# Patient Record
Sex: MALE
[De-identification: names, ages, dates, MRNs, and addresses within clinical notes are randomized per-mention and may not be internally consistent; named-entity substitution may affect disease eponyms.]

---

## 2019-08-15 ENCOUNTER — HOSPITAL ENCOUNTER (OUTPATIENT)
Dept: HOSPITAL 95 - ORSCSDS | Age: 64
Discharge: HOME | End: 2019-08-15
Attending: SURGERY
Payer: MEDICARE

## 2019-08-15 VITALS — HEIGHT: 65 IN | WEIGHT: 215.39 LBS | BODY MASS INDEX: 35.89 KG/M2

## 2019-08-15 DIAGNOSIS — R09.02: ICD-10-CM

## 2019-08-15 DIAGNOSIS — E78.5: ICD-10-CM

## 2019-08-15 DIAGNOSIS — I10: ICD-10-CM

## 2019-08-15 DIAGNOSIS — Z79.899: ICD-10-CM

## 2019-08-15 DIAGNOSIS — R19.5: Primary | ICD-10-CM

## 2019-08-15 DIAGNOSIS — J44.9: ICD-10-CM

## 2019-08-15 DIAGNOSIS — D12.2: ICD-10-CM

## 2019-08-15 DIAGNOSIS — Z86.010: ICD-10-CM

## 2019-08-15 DIAGNOSIS — F17.210: ICD-10-CM

## 2019-08-15 DIAGNOSIS — Z80.0: ICD-10-CM

## 2019-08-15 DIAGNOSIS — E66.01: ICD-10-CM

## 2019-08-15 PROCEDURE — 0DBK8ZX EXCISION OF ASCENDING COLON, VIA NATURAL OR ARTIFICIAL OPENING ENDOSCOPIC, DIAGNOSTIC: ICD-10-PCS | Performed by: SURGERY

## 2019-08-15 NOTE — NUR
08/15/19 0828 Justice Orozco
PAIN STIMULATOR LOCATED IN LOWER LEFT BACK. MD AWARE. MD OK TO PROCEED
WITHOUT TURNING IT OFF.

## 2019-10-11 ENCOUNTER — HOSPITAL ENCOUNTER (EMERGENCY)
Dept: HOSPITAL 95 - ER | Age: 64
Discharge: HOME | End: 2019-10-11
Payer: MEDICARE

## 2019-10-11 VITALS — WEIGHT: 205.01 LBS | BODY MASS INDEX: 34.16 KG/M2 | HEIGHT: 65 IN

## 2019-10-11 DIAGNOSIS — Z79.891: ICD-10-CM

## 2019-10-11 DIAGNOSIS — I10: ICD-10-CM

## 2019-10-11 DIAGNOSIS — F17.200: ICD-10-CM

## 2019-10-11 DIAGNOSIS — Z88.5: ICD-10-CM

## 2019-10-11 DIAGNOSIS — R07.0: Primary | ICD-10-CM

## 2019-10-11 DIAGNOSIS — Z79.899: ICD-10-CM

## 2021-10-04 ENCOUNTER — HOSPITAL ENCOUNTER (OUTPATIENT)
Dept: HOSPITAL 95 - ORSCSDS | Age: 66
Discharge: HOME | End: 2021-10-04
Attending: ORTHOPAEDIC SURGERY
Payer: MEDICARE

## 2021-10-04 VITALS — HEIGHT: 65 IN | BODY MASS INDEX: 33.98 KG/M2 | WEIGHT: 203.93 LBS

## 2021-10-04 DIAGNOSIS — Z79.899: ICD-10-CM

## 2021-10-04 DIAGNOSIS — F17.210: ICD-10-CM

## 2021-10-04 DIAGNOSIS — G56.21: Primary | ICD-10-CM

## 2021-10-04 DIAGNOSIS — E66.9: ICD-10-CM

## 2021-10-04 DIAGNOSIS — I10: ICD-10-CM

## 2021-10-04 DIAGNOSIS — Z79.84: ICD-10-CM

## 2021-10-04 PROCEDURE — 01N40ZZ RELEASE ULNAR NERVE, OPEN APPROACH: ICD-10-PCS | Performed by: ORTHOPAEDIC SURGERY

## 2021-10-04 NOTE — NUR
10/04/21 1129 [facility]
PT HAS BILATERAL CHIN BANDAGES. PER DR DEVI BANDAGES REMOVED, LEGS DRY &
REDDENED. OK TO PROCEED W/ SURGERY PER DR PARRA. DR PARRA PUT NEW
DRESSING ON BOTH LEGS W/ XEROFORM, ANIYAH, & COBAN. PT TOW.

## 2022-04-11 ENCOUNTER — HOSPITAL ENCOUNTER (OUTPATIENT)
Dept: HOSPITAL 95 - ORSCSDS | Age: 67
Discharge: HOME | End: 2022-04-11
Attending: ORTHOPAEDIC SURGERY
Payer: MEDICARE

## 2022-04-11 VITALS — HEIGHT: 65 IN | BODY MASS INDEX: 37.69 KG/M2 | WEIGHT: 226.19 LBS

## 2022-04-11 DIAGNOSIS — J44.9: ICD-10-CM

## 2022-04-11 DIAGNOSIS — F32.A: ICD-10-CM

## 2022-04-11 DIAGNOSIS — Z79.899: ICD-10-CM

## 2022-04-11 DIAGNOSIS — F17.210: ICD-10-CM

## 2022-04-11 DIAGNOSIS — K21.9: ICD-10-CM

## 2022-04-11 DIAGNOSIS — D21.11: Primary | ICD-10-CM

## 2022-04-11 DIAGNOSIS — I10: ICD-10-CM

## 2022-04-11 PROCEDURE — 0JBD0ZZ EXCISION OF RIGHT UPPER ARM SUBCUTANEOUS TISSUE AND FASCIA, OPEN APPROACH: ICD-10-PCS | Performed by: ORTHOPAEDIC SURGERY

## 2022-04-11 NOTE — NUR
04/11/22 1313 Mehreen Viramontes A
QUARTER SIZE RED AREA WITH PINPOINT WHITE HEAD NOTED ON RIGHT LATERAL
CHEST.  SURGEON AWARE.  NO DRAINAGE OR OPEN AREA NOTED.

## 2022-04-11 NOTE — NUR
04/11/22 1444 LUL GELLER
INFORMED BY DR ALEXIS IN PACU THAT PT BECOMES SOB WITH NORMAL
CONVERSATION PRIOR TO SURGERY. SOB IS HIS BASELINE. DENIES THAT HE HAS
COPD- HAS ALBUTEROL INHALER. PT NOT DIABETIC. LOWER EXTREMITIES RED
BELOW THE KNEE AND HAS SUPERFICIAL OPEN WOUNDS BOTH ANTERIOR AND
POSTERIOR. PREOP O2 DOCUMENTED AT 96%

## 2022-06-06 ENCOUNTER — HOSPITAL ENCOUNTER (OUTPATIENT)
Dept: HOSPITAL 95 - WOUND | Age: 67
Discharge: HOME | End: 2022-06-06
Attending: SURGERY
Payer: MEDICARE

## 2022-06-06 DIAGNOSIS — M54.16: ICD-10-CM

## 2022-06-06 DIAGNOSIS — L97.829: ICD-10-CM

## 2022-06-06 DIAGNOSIS — M20.42: ICD-10-CM

## 2022-06-06 DIAGNOSIS — I25.10: ICD-10-CM

## 2022-06-06 DIAGNOSIS — J44.9: ICD-10-CM

## 2022-06-06 DIAGNOSIS — L97.819: ICD-10-CM

## 2022-06-06 DIAGNOSIS — I70.213: ICD-10-CM

## 2022-06-06 DIAGNOSIS — I87.311: Primary | ICD-10-CM

## 2022-06-06 DIAGNOSIS — L84: ICD-10-CM

## 2022-06-06 DIAGNOSIS — I10: ICD-10-CM

## 2022-06-06 DIAGNOSIS — M79.672: ICD-10-CM

## 2022-06-06 DIAGNOSIS — F17.210: ICD-10-CM

## 2022-06-06 DIAGNOSIS — M79.671: ICD-10-CM

## 2022-06-06 DIAGNOSIS — I87.2: ICD-10-CM

## 2022-06-06 DIAGNOSIS — Z88.5: ICD-10-CM

## 2022-06-06 DIAGNOSIS — B35.1: ICD-10-CM

## 2022-06-06 DIAGNOSIS — I87.312: ICD-10-CM

## 2022-06-06 PROCEDURE — G0463 HOSPITAL OUTPT CLINIC VISIT: HCPCS

## 2022-06-13 ENCOUNTER — HOSPITAL ENCOUNTER (OUTPATIENT)
Dept: HOSPITAL 95 - WOUND | Age: 67
Discharge: HOME | End: 2022-06-13
Attending: SURGERY
Payer: MEDICARE

## 2022-06-13 DIAGNOSIS — M54.16: ICD-10-CM

## 2022-06-13 DIAGNOSIS — L89.029: ICD-10-CM

## 2022-06-13 DIAGNOSIS — L84: ICD-10-CM

## 2022-06-13 DIAGNOSIS — I87.313: Primary | ICD-10-CM

## 2022-06-13 DIAGNOSIS — L97.819: ICD-10-CM

## 2022-06-13 DIAGNOSIS — M20.42: ICD-10-CM

## 2022-06-13 DIAGNOSIS — Z72.0: ICD-10-CM

## 2022-06-13 DIAGNOSIS — B35.1: ICD-10-CM

## 2022-06-13 DIAGNOSIS — L97.822: ICD-10-CM

## 2022-06-13 DIAGNOSIS — I70.213: ICD-10-CM

## 2022-06-13 PROCEDURE — G0463 HOSPITAL OUTPT CLINIC VISIT: HCPCS

## 2022-06-13 PROCEDURE — A9270 NON-COVERED ITEM OR SERVICE: HCPCS

## 2022-06-20 ENCOUNTER — HOSPITAL ENCOUNTER (OUTPATIENT)
Dept: HOSPITAL 95 - WOUND | Age: 67
Discharge: HOME | End: 2022-06-20
Attending: SURGERY
Payer: MEDICARE

## 2022-06-20 DIAGNOSIS — B35.1: ICD-10-CM

## 2022-06-20 DIAGNOSIS — M79.671: ICD-10-CM

## 2022-06-20 DIAGNOSIS — M54.16: ICD-10-CM

## 2022-06-20 DIAGNOSIS — M79.672: ICD-10-CM

## 2022-06-20 DIAGNOSIS — L84: ICD-10-CM

## 2022-06-20 DIAGNOSIS — I70.213: ICD-10-CM

## 2022-06-20 DIAGNOSIS — L89.029: Primary | ICD-10-CM

## 2022-06-20 DIAGNOSIS — I87.303: ICD-10-CM

## 2022-06-20 DIAGNOSIS — M20.42: ICD-10-CM

## 2022-06-20 PROCEDURE — G0463 HOSPITAL OUTPT CLINIC VISIT: HCPCS

## 2022-06-20 PROCEDURE — A9270 NON-COVERED ITEM OR SERVICE: HCPCS

## 2022-06-27 ENCOUNTER — HOSPITAL ENCOUNTER (OUTPATIENT)
Dept: HOSPITAL 95 - WOUND | Age: 67
Discharge: HOME | End: 2022-06-27
Attending: SURGERY
Payer: MEDICARE

## 2022-06-27 DIAGNOSIS — M54.16: ICD-10-CM

## 2022-06-27 DIAGNOSIS — Z72.0: ICD-10-CM

## 2022-06-27 DIAGNOSIS — I87.313: ICD-10-CM

## 2022-06-27 DIAGNOSIS — L84: ICD-10-CM

## 2022-06-27 DIAGNOSIS — M20.42: ICD-10-CM

## 2022-06-27 DIAGNOSIS — L89.029: Primary | ICD-10-CM

## 2022-06-27 DIAGNOSIS — M20.41: ICD-10-CM

## 2022-06-27 DIAGNOSIS — I70.213: ICD-10-CM

## 2022-06-27 DIAGNOSIS — I73.9: ICD-10-CM

## 2022-06-27 DIAGNOSIS — B35.1: ICD-10-CM

## 2022-06-27 PROCEDURE — G0463 HOSPITAL OUTPT CLINIC VISIT: HCPCS

## 2022-08-08 ENCOUNTER — HOSPITAL ENCOUNTER (OUTPATIENT)
Dept: HOSPITAL 95 - LAB | Age: 67
End: 2022-08-08
Attending: FAMILY MEDICINE
Payer: MEDICARE

## 2022-08-08 DIAGNOSIS — E78.5: ICD-10-CM

## 2022-08-08 DIAGNOSIS — I10: Primary | ICD-10-CM

## 2022-08-08 LAB
ALBUMIN SERPL BCP-MCNC: 3.6 G/DL (ref 3.4–5)
ALBUMIN/GLOB SERPL: 1.3 {RATIO} (ref 0.8–1.8)
ALT SERPL W P-5'-P-CCNC: 53 U/L (ref 12–78)
ANION GAP SERPL CALCULATED.4IONS-SCNC: 6 MMOL/L (ref 6–16)
AST SERPL W P-5'-P-CCNC: 35 U/L (ref 12–37)
BILIRUB SERPL-MCNC: 0.3 MG/DL (ref 0.1–1)
BUN SERPL-MCNC: 28 MG/DL (ref 8–24)
CALCIUM SERPL-MCNC: 9 MG/DL (ref 8.5–10.1)
CHLORIDE SERPL-SCNC: 110 MMOL/L (ref 98–108)
CHOLEST SERPL-MCNC: 126 MG/DL (ref 50–200)
CHOLEST/HDLC SERPL: 1.7 {RATIO}
CO2 SERPL-SCNC: 26 MMOL/L (ref 21–32)
CREAT SERPL-MCNC: 1.07 MG/DL (ref 0.6–1.2)
GLOBULIN SER CALC-MCNC: 2.7 G/DL (ref 2.2–4)
GLUCOSE SERPL-MCNC: 100 MG/DL (ref 70–99)
HDLC SERPL-MCNC: 76 MG/DL (ref 39–?)
LDLC SERPL CALC-MCNC: 38 MG/DL (ref 0–110)
LDLC/HDLC SERPL: 0.5 {RATIO}
POTASSIUM SERPL-SCNC: 4.5 MMOL/L (ref 3.5–5.5)
PROT SERPL-MCNC: 6.3 G/DL (ref 6.4–8.2)
SODIUM SERPL-SCNC: 142 MMOL/L (ref 136–145)
TRIGL SERPL-MCNC: 58 MG/DL (ref 30–160)
VLDLC SERPL CALC-MCNC: 11 MG/DL (ref 6–32)

## 2022-12-13 ENCOUNTER — HOSPITAL ENCOUNTER (INPATIENT)
Dept: HOSPITAL 95 - ER | Age: 67
LOS: 4 days | DRG: 91 | End: 2022-12-17
Attending: HOSPITALIST | Admitting: HOSPITALIST
Payer: MEDICARE

## 2022-12-13 VITALS — BODY MASS INDEX: 31.51 KG/M2 | WEIGHT: 207.9 LBS | HEIGHT: 68 IN

## 2022-12-13 DIAGNOSIS — E86.0: ICD-10-CM

## 2022-12-13 DIAGNOSIS — Z51.5: ICD-10-CM

## 2022-12-13 DIAGNOSIS — N18.2: ICD-10-CM

## 2022-12-13 DIAGNOSIS — K46.9: ICD-10-CM

## 2022-12-13 DIAGNOSIS — F17.210: ICD-10-CM

## 2022-12-13 DIAGNOSIS — Z98.41: ICD-10-CM

## 2022-12-13 DIAGNOSIS — E87.6: ICD-10-CM

## 2022-12-13 DIAGNOSIS — B95.61: ICD-10-CM

## 2022-12-13 DIAGNOSIS — R79.1: ICD-10-CM

## 2022-12-13 DIAGNOSIS — L89.890: ICD-10-CM

## 2022-12-13 DIAGNOSIS — J18.9: ICD-10-CM

## 2022-12-13 DIAGNOSIS — E78.5: ICD-10-CM

## 2022-12-13 DIAGNOSIS — E87.21: ICD-10-CM

## 2022-12-13 DIAGNOSIS — Z96.652: ICD-10-CM

## 2022-12-13 DIAGNOSIS — I95.9: ICD-10-CM

## 2022-12-13 DIAGNOSIS — I46.8: ICD-10-CM

## 2022-12-13 DIAGNOSIS — Z98.42: ICD-10-CM

## 2022-12-13 DIAGNOSIS — G89.29: ICD-10-CM

## 2022-12-13 DIAGNOSIS — Z79.51: ICD-10-CM

## 2022-12-13 DIAGNOSIS — E87.0: ICD-10-CM

## 2022-12-13 DIAGNOSIS — L89.320: ICD-10-CM

## 2022-12-13 DIAGNOSIS — I12.9: ICD-10-CM

## 2022-12-13 DIAGNOSIS — Z98.890: ICD-10-CM

## 2022-12-13 DIAGNOSIS — Z20.822: ICD-10-CM

## 2022-12-13 DIAGNOSIS — E83.51: ICD-10-CM

## 2022-12-13 DIAGNOSIS — M54.9: ICD-10-CM

## 2022-12-13 DIAGNOSIS — L89.150: ICD-10-CM

## 2022-12-13 DIAGNOSIS — R78.81: ICD-10-CM

## 2022-12-13 DIAGNOSIS — Z79.899: ICD-10-CM

## 2022-12-13 DIAGNOSIS — G92.8: Primary | ICD-10-CM

## 2022-12-13 DIAGNOSIS — F41.9: ICD-10-CM

## 2022-12-13 DIAGNOSIS — G93.1: ICD-10-CM

## 2022-12-13 DIAGNOSIS — N17.0: ICD-10-CM

## 2022-12-13 DIAGNOSIS — Z79.52: ICD-10-CM

## 2022-12-13 DIAGNOSIS — F10.20: ICD-10-CM

## 2022-12-13 DIAGNOSIS — K21.9: ICD-10-CM

## 2022-12-13 DIAGNOSIS — Z88.5: ICD-10-CM

## 2022-12-13 DIAGNOSIS — Z66: ICD-10-CM

## 2022-12-13 DIAGNOSIS — J96.01: ICD-10-CM

## 2022-12-13 LAB
ALBUMIN SERPL BCP-MCNC: (no result) G/DL (ref 3.4–5)
ALT SERPL W P-5'-P-CCNC: 69 U/L (ref 12–78)
ANION GAP SERPL CALCULATED.4IONS-SCNC: 17 MMOL/L (ref 6–16)
ANION GAP SERPL CALCULATED.4IONS-SCNC: 21 MMOL/L (ref 6–16)
AST SERPL W P-5'-P-CCNC: 80 U/L (ref 12–37)
BASOPHILS # BLD: 0 K/MM3 (ref 0–0.23)
BASOPHILS NFR BLD: 0 % (ref 0–2)
BILIRUB SERPL-MCNC: 0.4 MG/DL (ref 0.1–1)
BUN SERPL-MCNC: 102 MG/DL (ref 8–24)
BUN SERPL-MCNC: 97 MG/DL (ref 8–24)
CALCIUM SERPL-MCNC: 7.5 MG/DL (ref 8.5–10.1)
CALCIUM SERPL-MCNC: 8.3 MG/DL (ref 8.5–10.1)
CHLORIDE SERPL-SCNC: 107 MMOL/L (ref 98–108)
CHLORIDE SERPL-SCNC: 112 MMOL/L (ref 98–108)
CK SERPL-CCNC: 260 U/L (ref 39–308)
CO2 SERPL-SCNC: 12 MMOL/L (ref 21–32)
CO2 SERPL-SCNC: 12 MMOL/L (ref 21–32)
CREAT SERPL-MCNC: 4.12 MG/DL (ref 0.6–1.2)
CREAT SERPL-MCNC: 4.84 MG/DL (ref 0.6–1.2)
DEPRECATED RDW RBC AUTO: 60 FL (ref 35.1–46.3)
EOSINOPHIL # BLD: 0 K/MM3 (ref 0–0.68)
EOSINOPHIL NFR BLD: 0 % (ref 0–6)
ERYTHROCYTE [DISTWIDTH] IN BLOOD BY AUTOMATED COUNT: 18.5 % (ref 11.7–14.2)
ETHANOL SERPL-MCNC: <3 MG/DL
FLUAV RNA SPEC QL NAA+PROBE: NEGATIVE
FLUBV RNA SPEC QL NAA+PROBE: NEGATIVE
GLUCOSE SERPL-MCNC: 74 MG/DL (ref 70–99)
GLUCOSE SERPL-MCNC: 91 MG/DL (ref 70–99)
HCT VFR BLD AUTO: 33.9 % (ref 37–53)
HGB BLD-MCNC: 11.5 G/DL (ref 13.5–17.5)
LYMPHOCYTES # BLD: 2.62 K/MM3 (ref 0.84–5.2)
LYMPHOCYTES NFR BLD: 7 % (ref 21–46)
MAGNESIUM SERPL-MCNC: 2.6 MG/DL (ref 1.6–2.4)
MCHC RBC AUTO-ENTMCNC: 33.9 G/DL (ref 31.5–36.5)
MCV RBC AUTO: 89 FL (ref 80–100)
MONOCYTES # BLD: 1.87 K/MM3 (ref 0.16–1.47)
MONOCYTES NFR BLD: 5 % (ref 4–13)
NEUTS BAND NFR BLD MANUAL: 10 % (ref 0–8)
NEUTS SEG # BLD MANUAL: 33 K/MM3 (ref 1.96–9.15)
NEUTS SEG NFR BLD MANUAL: 78 % (ref 41–73)
NRBC # BLD AUTO: 0.08 K/MM3 (ref 0–0.02)
NRBC BLD AUTO-RTO: 0.2 /100 WBC (ref 0–0.2)
PH BLDV: 7.16 [PH] (ref 7.34–7.37)
PH BLDV: 7.19 [PH] (ref 7.34–7.37)
PLATELET # BLD AUTO: 310 K/MM3 (ref 150–400)
POTASSIUM SERPL-SCNC: 3.3 MMOL/L (ref 3.5–5.5)
POTASSIUM SERPL-SCNC: 4.9 MMOL/L (ref 3.5–5.5)
PROT SERPL-MCNC: 7.2 G/DL (ref 6.4–8.2)
PROT UR STRIP-MCNC: (no result) MG/DL
PROTHROMBIN TIME: 14 SEC (ref 9.7–11.5)
RBC #/AREA URNS HPF: (no result) /HPF (ref 0–2)
RSV RNA SPEC QL NAA+PROBE: NEGATIVE
SARS-COV-2 RNA RESP QL NAA+PROBE: NEGATIVE
SODIUM SERPL-SCNC: 140 MMOL/L (ref 136–145)
SODIUM SERPL-SCNC: 141 MMOL/L (ref 136–145)
SP GR SPEC: 1.01 (ref 1–1.02)
TOTAL CELLS COUNTED BLD: 100
TSH SERPL DL<=0.005 MIU/L-ACNC: 1.2 UIU/ML (ref 0.36–4.8)
UROBILINOGEN UR STRIP-MCNC: (no result) MG/DL
WBC #/AREA URNS HPF: (no result) /HPF (ref 0–5)

## 2022-12-13 PROCEDURE — A9270 NON-COVERED ITEM OR SERVICE: HCPCS

## 2022-12-13 PROCEDURE — C1751 CATH, INF, PER/CENT/MIDLINE: HCPCS

## 2022-12-13 PROCEDURE — 5A1945Z RESPIRATORY VENTILATION, 24-96 CONSECUTIVE HOURS: ICD-10-PCS | Performed by: HOSPITALIST

## 2022-12-13 PROCEDURE — P9047 ALBUMIN (HUMAN), 25%, 50ML: HCPCS

## 2022-12-13 PROCEDURE — G0480 DRUG TEST DEF 1-7 CLASSES: HCPCS

## 2022-12-13 PROCEDURE — C9113 INJ PANTOPRAZOLE SODIUM, VIA: HCPCS

## 2022-12-14 LAB
ALBUMIN SERPL BCP-MCNC: 1.2 G/DL (ref 3.4–5)
ALBUMIN SERPL BCP-MCNC: 1.7 G/DL (ref 3.4–5)
ALBUMIN SERPL BCP-MCNC: 2.1 G/DL (ref 3.4–5)
ALBUMIN/GLOB SERPL: 0.3 {RATIO} (ref 0.8–1.8)
ALT SERPL W P-5'-P-CCNC: 61 U/L (ref 12–78)
ANION GAP SERPL CALCULATED.4IONS-SCNC: 17 MMOL/L (ref 6–16)
ANION GAP SERPL CALCULATED.4IONS-SCNC: 20 MMOL/L (ref 6–16)
ANION GAP SERPL CALCULATED.4IONS-SCNC: 23 MMOL/L (ref 6–16)
AST SERPL W P-5'-P-CCNC: 100 U/L (ref 12–37)
BASOPHILS # BLD AUTO: 0.15 K/MM3 (ref 0–0.23)
BASOPHILS NFR BLD AUTO: 1 % (ref 0–2)
BILIRUB SERPL-MCNC: 0.4 MG/DL (ref 0.1–1)
BUN SERPL-MCNC: 103 MG/DL (ref 8–24)
BUN SERPL-MCNC: 95 MG/DL (ref 8–24)
BUN SERPL-MCNC: 98 MG/DL (ref 8–24)
CALCIUM SERPL-MCNC: 6.3 MG/DL (ref 8.5–10.1)
CALCIUM SERPL-MCNC: 6.6 MG/DL (ref 8.5–10.1)
CALCIUM SERPL-MCNC: 7.7 MG/DL (ref 8.5–10.1)
CHLORIDE SERPL-SCNC: 107 MMOL/L (ref 98–108)
CHLORIDE SERPL-SCNC: 110 MMOL/L (ref 98–108)
CHLORIDE SERPL-SCNC: 111 MMOL/L (ref 98–108)
CO2 SERPL-SCNC: 14 MMOL/L (ref 21–32)
CO2 SERPL-SCNC: 19 MMOL/L (ref 21–32)
CO2 SERPL-SCNC: 21 MMOL/L (ref 21–32)
CREAT SERPL-MCNC: 3.7 MG/DL (ref 0.6–1.2)
CREAT SERPL-MCNC: 3.77 MG/DL (ref 0.6–1.2)
CREAT SERPL-MCNC: 4.32 MG/DL (ref 0.6–1.2)
DEPRECATED RDW RBC AUTO: 64.8 FL (ref 35.1–46.3)
EOSINOPHIL # BLD AUTO: 0.01 K/MM3 (ref 0–0.68)
EOSINOPHIL NFR BLD AUTO: 0 % (ref 0–6)
ERYTHROCYTE [DISTWIDTH] IN BLOOD BY AUTOMATED COUNT: 19.2 % (ref 11.7–14.2)
GLOBULIN SER CALC-MCNC: 4.4 G/DL (ref 2.2–4)
GLUCOSE SERPL-MCNC: 170 MG/DL (ref 70–99)
GLUCOSE SERPL-MCNC: 69 MG/DL (ref 70–99)
GLUCOSE SERPL-MCNC: 94 MG/DL (ref 70–99)
HCT VFR BLD AUTO: 29.8 % (ref 37–53)
HGB BLD-MCNC: 9.8 G/DL (ref 13.5–17.5)
IMM GRANULOCYTES # BLD AUTO: 1.42 K/MM3 (ref 0–0.1)
IMM GRANULOCYTES NFR BLD AUTO: 5 % (ref 0–1)
LEUKOCYTE ESTERASE UR QL STRIP: (no result)
LYMPHOCYTES # BLD AUTO: 3.24 K/MM3 (ref 0.84–5.2)
LYMPHOCYTES NFR BLD AUTO: 10 % (ref 21–46)
MAGNESIUM SERPL-MCNC: 3.5 MG/DL (ref 1.6–2.4)
MCHC RBC AUTO-ENTMCNC: 32.9 G/DL (ref 31.5–36.5)
MCV RBC AUTO: 92 FL (ref 80–100)
MONOCYTES # BLD AUTO: 1.62 K/MM3 (ref 0.16–1.47)
MONOCYTES NFR BLD AUTO: 5 % (ref 4–13)
NEUTROPHILS # BLD AUTO: 25.06 K/MM3 (ref 1.96–9.15)
NEUTROPHILS NFR BLD AUTO: 80 % (ref 41–73)
NRBC # BLD AUTO: 0.41 K/MM3 (ref 0–0.02)
NRBC BLD AUTO-RTO: 1.3 /100 WBC (ref 0–0.2)
PH BLDA: 7.08 [PH] (ref 7.35–7.45)
PHOSPHATE SERPL-MCNC: 4.2 MG/DL (ref 2.5–4.9)
PHOSPHATE SERPL-MCNC: 6.5 MG/DL (ref 2.5–4.9)
PLATELET # BLD AUTO: 314 K/MM3 (ref 150–400)
POTASSIUM SERPL-SCNC: 2.9 MMOL/L (ref 3.5–5.5)
POTASSIUM SERPL-SCNC: 3.2 MMOL/L (ref 3.5–5.5)
POTASSIUM SERPL-SCNC: 4.4 MMOL/L (ref 3.5–5.5)
PROT SERPL-MCNC: 5.6 G/DL (ref 6.4–8.2)
PROT UR STRIP-MCNC: (no result) MG/DL
RBC #/AREA URNS HPF: (no result) /HPF (ref 0–2)
SODIUM SERPL-SCNC: 145 MMOL/L (ref 136–145)
SODIUM SERPL-SCNC: 148 MMOL/L (ref 136–145)
SODIUM SERPL-SCNC: 149 MMOL/L (ref 136–145)
SP GR SPEC: 1.01 (ref 1–1.02)
URN SPEC COLLECT METH UR: (no result)
UROBILINOGEN UR STRIP-MCNC: (no result) MG/DL
WBC #/AREA URNS HPF: (no result) /HPF (ref 0–5)

## 2022-12-14 PROCEDURE — 0BH17EZ INSERTION OF ENDOTRACHEAL AIRWAY INTO TRACHEA, VIA NATURAL OR ARTIFICIAL OPENING: ICD-10-PCS | Performed by: STUDENT IN AN ORGANIZED HEALTH CARE EDUCATION/TRAINING PROGRAM

## 2022-12-14 PROCEDURE — 3E033XZ INTRODUCTION OF VASOPRESSOR INTO PERIPHERAL VEIN, PERCUTANEOUS APPROACH: ICD-10-PCS | Performed by: HOSPITALIST

## 2022-12-14 PROCEDURE — 5A12012 PERFORMANCE OF CARDIAC OUTPUT, SINGLE, MANUAL: ICD-10-PCS | Performed by: STUDENT IN AN ORGANIZED HEALTH CARE EDUCATION/TRAINING PROGRAM

## 2022-12-14 PROCEDURE — 02HV33Z INSERTION OF INFUSION DEVICE INTO SUPERIOR VENA CAVA, PERCUTANEOUS APPROACH: ICD-10-PCS

## 2022-12-14 NOTE — NUR
2030 pt arrived to floor. assessment complete. skin assessment complete with
2nd RN. noted small open area on right buttock, ble dusky/red, scabs on toes.
pt oriented to self, responds to yes/no questions. able to tell me about his
back pain. follows commands with turning and assessment tasks. on room air, no
cough. ciwa 7. iv fluids continues, abx continues. SBP in the 90's. call light
in reach, bed alarm on.

## 2022-12-14 NOTE — NUR
RN RESPONDED TO CODE BLUE FOR PATIENT ON 3RD FLOOR. COMPRESSIONS ONGOING UPON
ARRIVAL.  ROSC OBTAINED AFTER A COUPLE ROUNDS OF CPR.  PATIENT INTUBATED.
R. IJ CVL PLACED AND
LEVOPHED STARTED TO MAINTAIN MAPS >65. SODIUM BICARB GTT INFUSING.  PATIENT
BROUGHT TO CT FOR A HEAD SCAN.  AFTER CT COMPLETED PATIENT TRANSFERRED TO ICU.
PATIENT RESTRAINED, ROSAS PLACED, AND IV PUSH OF VERSED GIVEN TO SEDATE
PATIENT.  PATIENT'S WIFE AT BEDSIDE.  REPORT GIVEN TO DAY RN.

## 2022-12-14 NOTE — NUR
Call back - Spouse Nanette was in the fonseca and had just finished speaking
with the Doc. Provided space for Nanette to verbalized her frustrations and
emotions. She was tearful and angry at times. Nanette was upset she was not
allowed to stay last night with pt. Pt stopped breathing and coded. Nanette
states she would have been able to notify staff is she would have been here.
Affirmed here stated emotions and assured her it was not her fault. Deana
had been hyperventalating. She stated she will "have to let him go," but also
showed signs of hope. Sister lives close by she is close to. Deana
collected herself before entering to see pt. She appeared more at ease after
being able to see him. Updated ICU Nursing Supervisor.

## 2022-12-14 NOTE — NUR
ASSUMPTION OF CARE
BEDSIDE REPORT RECEIVED FROM DIANNE BOOTH. PT INTUBATED WITH VENT SETTINGS AC/VC
16/500/5/60%. [facility]. PT RECEIVING
LEVOPHED 7MCG/MIN, BICARB 100ML/HR. PROPOFOL STARTED AT 25MCG/KG/MIN. PT
RESPONDS TO PAINFUL STIMULI BY GRIMACING, SHAKING HEAD, AND WITHDRAWING
EXTREMITIES. PUPILS 5MM, SLUGGISH TO RESPOND. NSR ON MONITOR WITH RATE IN 90S.
LEVO AT 7MCG/MIN TO MAINTAIN MAP >65. RADIAL PULSES FAINT, DOPPLER PEDAL
PULSES. MOTTLING FROM FEET TO HIPS BILATERALLY.  LUNGS COARSE THROUGHOUT.
ABDOMEN SEMI-FIRM, BOWEL TONES HYPOACTIVE. OGT PLACED AND VERIFIED WITH RETURN
OF GASTRIC CONTENTS. ROSAS PATENT AND DRAINING YELLOW/GREEN URINE TO GRAVITY.
SPOUSE AKIL AT BEDSIDE.
DR ROSS AND DR CHIN AT BEDSIDE FOR EVAL. DR ORDAZ AT BEDSIDE FOR EVAL.

## 2022-12-14 NOTE — NUR
SHIFT SUMMARY
PT IS RECEIVING PROPOFOL 35MCG/KG/MIN, LEVOPHED 7MCG/MIN, BICARB 150ML/HR, LR
150ML/HR.
NEURO: PT RESPONDS TO NOXIOUS/PAINFUL STIMULI. PT OCCASIONALLY OPENS EYES,
MOVES ALL EXTREMITIES DURING CARE. PUPILS 4MM, SLUGGISH TO RESPOND. COUGH/GAG
INTACT.
RESP: VENT SETTINGS AC/VC 16/500/5/40%. ETT ADJUSTED TO 24CM THIS AM BY
WILLIE YOST. LUNGS ARE COARSE THROUGHOUT. SMALL AMOUNT OF WHITE/TAN/BROWN ETT
SECRETIONS.
CARDIAC: LEVOPHED INFUSING TO MAINTAIN MAP >65. NSR ON MONITOR WITH RATE IN
80S-100S. FAINT RADIAL PULSES, DOPPLER PEDAL PULSES. HANDS/FEET REMAIN COOL
TO TOUCH.
GI: 250ML DARK BROWN DRAINAGE FROM OGT THIS SHIFT. OGT NOW CLAMPED. ABDOMEN
SLIGHTLY DISTENDED, FIRM. BOWEL TONES HYPOACTIVE. 200ML FREE WATER FLUSHES
Q6HRS. PT HAS THIN LIQUID STOOL. RECTAL TUBE PLACED AT END OF SHIFT.
: URINE OUTPUT 310ML THIS SHIFT. DR GARCIA AWARE. URINE IS YELLOW, NO
SEDIMENT.
SKIN: ULCERS ON BUTTOCKS, PHOTO IN CHART. ULCER ON TOE. MOTTLING IMPROVED
THROUGHOUT SHIFT.
 
KCL INFUSING. ONCE 40MEQ IS INFUSED, REPEAT RENAL PANEL AND NOTIFY DR GARCIA
BEFORE BEGINNING SECOND BAG. FAMILY HAS BEEN AT BEDSIDE THROUGHOUT DAY.

## 2022-12-14 NOTE — NUR
0430 ENTERED PT ROOM FOR IV BEEPING, PT ARM BENT, STRAIGHTENED ARM AND
RESTARTED FLUIDS. PT STILL BREATHING AND MOVING.
 
0458 ENTERED ROOM TO ADMINISTER SCHEDULED MEDS. NOTED PT TO BE PALE AND NOT
BREATHING, NO PULSE. PULLED CODE BLUE CORD AND STARTED CPR.
 
0502 CODE TEAM ARRIVED. SEE CODE DOCUMENTATION. INTUBATED, MEDICATIONS GIVEN,
PULSE OBTAINED. LABS DRAWN, BG 91, EKG NSR. CENTRAL LINE PLACED BY DR. MCKEON. REPORT GIVEN TO ICU RN, WIFE CALLED BY ANOTHER NURSE ON THE FLOOR,
NEYMAR.
 
0610 PT LEFT FLOOR TO CT.

## 2022-12-14 NOTE — NUR
Spiritual Care Visit.
Pt, is intubated and mostly not responsive. Spouse is present and welcomes my
visit. Spouse is unsettled with concerns that she could not stay with the Pt.
overnight. She is accustomed to "always be with him."  Listen with empathy and
attempt to normalize the Pt. family experience. Spouse displays evidence of
reluctant understaning.
 
Establish rapport and facilitate a life review. Prayed with Pt. and spouse.
Spouse verbalized how she and Pt. had lost everything in the [employer]
Fire.  With a genuine interest and calming presence, Spouse displays evidence
of trust and agreement. Spouse verbalized gratitude for the spiritual care
visit.

## 2022-12-15 LAB
ALBUMIN SERPL BCP-MCNC: 1.5 G/DL (ref 3.4–5)
ALBUMIN SERPL BCP-MCNC: 1.9 G/DL (ref 3.4–5)
ALBUMIN SERPL BCP-MCNC: 2.1 G/DL (ref 3.4–5)
ALBUMIN/GLOB SERPL: 0.6 {RATIO} (ref 0.8–1.8)
ALT SERPL W P-5'-P-CCNC: 67 U/L (ref 12–78)
AMYLASE SERPL-CCNC: 89 U/L (ref 25–115)
ANION GAP SERPL CALCULATED.4IONS-SCNC: 16 MMOL/L (ref 6–16)
ANION GAP SERPL CALCULATED.4IONS-SCNC: 16 MMOL/L (ref 6–16)
ANION GAP SERPL CALCULATED.4IONS-SCNC: 17 MMOL/L (ref 6–16)
AST SERPL W P-5'-P-CCNC: 191 U/L (ref 12–37)
BASOPHILS # BLD AUTO: 0.07 K/MM3 (ref 0–0.23)
BASOPHILS # BLD AUTO: 0.08 K/MM3 (ref 0–0.23)
BASOPHILS NFR BLD AUTO: 1 % (ref 0–2)
BASOPHILS NFR BLD AUTO: 1 % (ref 0–2)
BILIRUB SERPL-MCNC: 0.8 MG/DL (ref 0.1–1)
BUN SERPL-MCNC: 91 MG/DL (ref 8–24)
BUN SERPL-MCNC: 93 MG/DL (ref 8–24)
BUN SERPL-MCNC: 97 MG/DL (ref 8–24)
CALCIUM SERPL-MCNC: 5.9 MG/DL (ref 8.5–10.1)
CALCIUM SERPL-MCNC: 6.1 MG/DL (ref 8.5–10.1)
CALCIUM SERPL-MCNC: 6.3 MG/DL (ref 8.5–10.1)
CHLORIDE SERPL-SCNC: 104 MMOL/L (ref 98–108)
CO2 SERPL-SCNC: 21 MMOL/L (ref 21–32)
CO2 SERPL-SCNC: 22 MMOL/L (ref 21–32)
CO2 SERPL-SCNC: 23 MMOL/L (ref 21–32)
CREAT SERPL-MCNC: 3.67 MG/DL (ref 0.6–1.2)
CREAT SERPL-MCNC: 3.71 MG/DL (ref 0.6–1.2)
CREAT SERPL-MCNC: 3.81 MG/DL (ref 0.6–1.2)
D DIMER PPP FEU-MCNC: 5.23 MG/L FEU (ref 0–0.52)
DEPRECATED RDW RBC AUTO: 53.1 FL (ref 35.1–46.3)
DEPRECATED RDW RBC AUTO: 53.9 FL (ref 35.1–46.3)
EOSINOPHIL # BLD AUTO: 0.02 K/MM3 (ref 0–0.68)
EOSINOPHIL # BLD AUTO: 0.02 K/MM3 (ref 0–0.68)
EOSINOPHIL NFR BLD AUTO: 0 % (ref 0–6)
EOSINOPHIL NFR BLD AUTO: 0 % (ref 0–6)
ERYTHROCYTE [DISTWIDTH] IN BLOOD BY AUTOMATED COUNT: 17.6 % (ref 11.7–14.2)
ERYTHROCYTE [DISTWIDTH] IN BLOOD BY AUTOMATED COUNT: 17.6 % (ref 11.7–14.2)
GLOBULIN SER CALC-MCNC: 3.3 G/DL (ref 2.2–4)
GLUCOSE SERPL-MCNC: 139 MG/DL (ref 70–99)
GLUCOSE SERPL-MCNC: 164 MG/DL (ref 70–99)
GLUCOSE SERPL-MCNC: 204 MG/DL (ref 70–99)
HCT VFR BLD AUTO: 26.5 % (ref 37–53)
HCT VFR BLD AUTO: 27.6 % (ref 37–53)
HGB BLD-MCNC: 9.3 G/DL (ref 13.5–17.5)
HGB BLD-MCNC: 9.8 G/DL (ref 13.5–17.5)
IMM GRANULOCYTES # BLD AUTO: 0.2 K/MM3 (ref 0–0.1)
IMM GRANULOCYTES # BLD AUTO: 0.22 K/MM3 (ref 0–0.1)
IMM GRANULOCYTES NFR BLD AUTO: 2 % (ref 0–1)
IMM GRANULOCYTES NFR BLD AUTO: 2 % (ref 0–1)
LYMPHOCYTES # BLD AUTO: 0.97 K/MM3 (ref 0.84–5.2)
LYMPHOCYTES # BLD AUTO: 1.01 K/MM3 (ref 0.84–5.2)
LYMPHOCYTES NFR BLD AUTO: 10 % (ref 21–46)
LYMPHOCYTES NFR BLD AUTO: 10 % (ref 21–46)
MCHC RBC AUTO-ENTMCNC: 35.1 G/DL (ref 31.5–36.5)
MCHC RBC AUTO-ENTMCNC: 35.5 G/DL (ref 31.5–36.5)
MCV RBC AUTO: 84 FL (ref 80–100)
MCV RBC AUTO: 85 FL (ref 80–100)
MONOCYTES # BLD AUTO: 0.35 K/MM3 (ref 0.16–1.47)
MONOCYTES # BLD AUTO: 0.49 K/MM3 (ref 0.16–1.47)
MONOCYTES NFR BLD AUTO: 3 % (ref 4–13)
MONOCYTES NFR BLD AUTO: 5 % (ref 4–13)
NEUTROPHILS # BLD AUTO: 8.26 K/MM3 (ref 1.96–9.15)
NEUTROPHILS # BLD AUTO: 8.54 K/MM3 (ref 1.96–9.15)
NEUTROPHILS NFR BLD AUTO: 82 % (ref 41–73)
NEUTROPHILS NFR BLD AUTO: 84 % (ref 41–73)
NRBC # BLD AUTO: 0.28 K/MM3 (ref 0–0.02)
NRBC # BLD AUTO: 0.39 K/MM3 (ref 0–0.02)
NRBC BLD AUTO-RTO: 2.8 /100 WBC (ref 0–0.2)
NRBC BLD AUTO-RTO: 3.8 /100 WBC (ref 0–0.2)
PHOSPHATE SERPL-MCNC: 3.1 MG/DL (ref 2.5–4.9)
PHOSPHATE SERPL-MCNC: 3.1 MG/DL (ref 2.5–4.9)
PLATELET # BLD AUTO: 147 K/MM3 (ref 150–400)
PLATELET # BLD AUTO: 166 K/MM3 (ref 150–400)
POTASSIUM SERPL-SCNC: 3.4 MMOL/L (ref 3.5–5.5)
POTASSIUM SERPL-SCNC: 3.7 MMOL/L (ref 3.5–5.5)
POTASSIUM SERPL-SCNC: 3.7 MMOL/L (ref 3.5–5.5)
PROT SERPL-MCNC: 5.4 G/DL (ref 6.4–8.2)
PROTHROMBIN TIME: 13.5 SEC (ref 9.7–11.5)
SODIUM SERPL-SCNC: 141 MMOL/L (ref 136–145)
SODIUM SERPL-SCNC: 142 MMOL/L (ref 136–145)
SODIUM SERPL-SCNC: 144 MMOL/L (ref 136–145)
VANCOMYCIN SERPL-MCNC: 30.7 UG/ML
VANCOMYCIN SERPL-MCNC: 36.5 UG/ML

## 2022-12-15 NOTE — NUR
Spoke with Primary RN and discussed case.
 
Pt resting in bed and is intubated. Pt appears comfortable with no S/S of
distress at this time. Pt's spouse and other visitors at bedside. Offered
therapeutic listening as spouse Deana reports being  to Pt for over
40 years. Deana reports Pt has children but she does not. Continued
therapeutic listening as she discusses plan. She inquires if this RN or
someone from our department can assist with decisions if needed. Instructed
that PC RN can help with understanding of risk and benefits of decisions.
Discussed evaluating Pt's goals and values will also be important with any
potential decisions. Continued supportive visit. Provided Palliative Care
contact information per request of spouse. Spouse agreeable for continued PC
visits.
 
Palliative Care will remain available

## 2022-12-15 NOTE — NUR
SHIFT SUMMARY
PT REMAINS INTUBATED WITH VENT SETTINGS AC/VC 16/500/5/40%. HE IS RECEIVING
PROPOFOL 40MCG/KG/MIN AND LEVOPHED 7MCG/MIN. PT MAKES OCCASIONAL SMALL
MOVEMENTS WITH EXTREMITIES AND CONTINUES RHTYHMIC HEAD MOVEMENT. PT HAS NOT
FOLLOWED COMMANDS TODAY. LUNGS ARE COARSE, DIMINISHED IN BASES. TUBE FEEDING
INITIATED THIS SHIFT. VHP INFUSING AT 20ML/HR VIA OGT. ABDOMEN REMAINS
DISTENDED/FIRM AND TENDER. ROSAS PATENT WITH MINIMAL URINE OUTPUT. DR GARCIA
IS AWARE AND PLAN TO MONITOR UNTIL AM. FAMILY AT BEDSIDE THROUGHOUT DAY AND
UPDATED ON PT CONDITION. TMAX 100.0. CONTINUING TO MONITOR AT THIS TIME.

## 2022-12-15 NOTE — NUR
UPDATE
MULTIPLE LABS PENDING. DR ORDAZ NOTIFIED OF DISTENDED ABDOMEN AND CRITICAL LAB
VALUES. NO BRUISING NOTED TO ABDOMEN OR FLANK. FAMILY REMAINS AT BEDSIDE.

## 2022-12-15 NOTE — NUR
END OF SHIFT SUMMARY
NO ACUTE EVENTS OVER NIGHT. PT REMAINS OM VENT AC/VC AT 30% VISUALY PT LOOKS
LIKE THEY ARE GUPIE BREATHING INTERMITANTLY HIS HEAD SEEMS TO PULL TO RIGHT
EVERY BREATH DURING THESE TIMES. PT IS COURSE BILATERALY SCANT AMOUNT OF TAN
SECREATIONS FROM ET TUBE. ORAL SECREATIONS ARE BROWN AND OCCASSIONAL BLOOD
TINGED. NEUROLOGICALY ASSESMENT ON SEDATION WITHDRAW X4 TO PAIN AND SOME
SPONTANIOUS MOVEMENT IN ALL  EXTREMITIES. PT HAS A DOWNWARD GAZE PUPILS ARE
5MM AND BRISK. PT HAS STILL HAS COUGH AND GAG. THERE IS A DOCUMENTED NOTE IN A
CT ORDER FOR LEFT EXTREMITES AND THE HEAD CT IS TO BE DONE AT SAME TIME
ALTHOUGH THE ORDER WASNT PLACE. PT HAS ONLY HAD 35 ML U/0  ROSAS WAS FLUSHED
TO ASSURE IT WASNT CLOGED.  THE RECTAL TUBE HAS MINNAMAL O/P. CV PT HAS BEEN
ST RATE  -115 BLOOD PRESSURE IS MAINTAINED MAP GREATER THAN 65 ON 7 OF
LEVO. PT IS COMFORTABLE ON PROPOFOL AT 40. WILL CONTINUE TO MONITOR AND
REPORT OFF TO ONCOMING RN

## 2022-12-15 NOTE — NUR
ASSUMPTION OF CARE
BEDSIDE REPORT RECEIVED FROM GATITO BOOTH. PT REMAINS INTUBATED WITH VENT
SETTINGS AC/VC 16/500/5/30%. HE IS RECEIVING PROPOFOL 40MCG/KG/MIN, LEVOPHED
7MCG/MIN, LR 150ML/HR, AND KCL.
PT MAKES RHTYHMIC HEAD MOVEMENT SIDE TO SIDE. OCCASIONAL MOVEMENT OF
EXTREMITIES. PUPILS 4MM, RESPONDS BRISKLY TO LIGHT. SINUS TACH ON MONITOR WITH
RATE 100S-110. FAINT RADIAL PULSES, DOPPLER PEDAL PULSES. GRIMACING WITH
NOXIOUS STIMULI. LUNGS ARE COARSE, DIMINISHED IN BASES. OGT REMAINS IN PLACE,
100ML DARK BROWN/BLACK LIQUID REMOVED VIA SUCTION. ABDOMEN IS MODERATELY
DISTENDED AND MORE FIRM IN COMPARISON TO YESTERDAY. UNABLE TO AUSCULTATE BOWEL
TONES. DR LANDA NOTIFIED. RECTAL TUBE IN PLACE WITH NO OUTPUT. ROSAS PATENT
WITH MINIMAL AMOUNT OF YELLOW URINE IN TUBING.
FAMILY NOW AT BEDSIDE AND UPDATED ON PT CONDITION. SEE SHIFT ASSESSMENT.

## 2022-12-15 NOTE — NUR
SEDATION VACATION
PROPOFOL PLACED ON STANDBY AT 0740. PT BECOMES RESTLESS, MOVING ALL
EXTREMITIES. RHYTHMIC SIDE TO SIDE HEAD MOVEMENTS INCREASE. HE GRIMACES WITH
NOXIOUS STIMULI.  PT DOES NOT OPEN EYES OR FOLLOW ANY COMMANDS THROUGHOUT
SEDATION VACATION. COUGH/GAG INTACT. PUPILS 4MM, EQUAL AND RESPONSIVE TO
LIGHT. RR INCREASES TO 28-35 WITH -550. HR INCREASES TO CONSISTENT 120S.
AFTER 1 HR, PT REMAINS UNABLE TO FOLLOW COMMANDS. DUE TO INCREASED
RESTLESSNESS, TACHYPNEA, AND TACHYCARDIA PROPOFOL RESTARTED AT 20MCG/KG/MIN.
AFTER APPROX 30MIN, PT REMAINS RESTLESS, PROPOFOL TITRATED TO 40MCG/KG/MIN. RR
DECREASES TO 18-21 WITH -510S. HR 100S-110S. PT NO LONGER RESTLESS.
FAMILY AT BEDSIDE. PROVIDED EDUCATION REGARDING SEDATION VACATION. FAMILY
VERBALIZES UNDERSTANDING.

## 2022-12-16 LAB
ALBUMIN SERPL BCP-MCNC: 1.8 G/DL (ref 3.4–5)
ALBUMIN/GLOB SERPL: 0.5 {RATIO} (ref 0.8–1.8)
ALT SERPL W P-5'-P-CCNC: 71 U/L (ref 12–78)
ANION GAP SERPL CALCULATED.4IONS-SCNC: 17 MMOL/L (ref 6–16)
AST SERPL W P-5'-P-CCNC: 210 U/L (ref 12–37)
BASOPHILS # BLD: 0 K/MM3 (ref 0–0.23)
BASOPHILS NFR BLD: 0 % (ref 0–2)
BILIRUB SERPL-MCNC: 0.7 MG/DL (ref 0.1–1)
BUN SERPL-MCNC: 95 MG/DL (ref 8–24)
CALCIUM SERPL-MCNC: 5.9 MG/DL (ref 8.5–10.1)
CHLORIDE SERPL-SCNC: 102 MMOL/L (ref 98–108)
CO2 SERPL-SCNC: 23 MMOL/L (ref 21–32)
CREAT SERPL-MCNC: 3.95 MG/DL (ref 0.6–1.2)
DEPRECATED RDW RBC AUTO: 57.1 FL (ref 35.1–46.3)
EOSINOPHIL # BLD: 0 K/MM3 (ref 0–0.68)
EOSINOPHIL NFR BLD: 0 % (ref 0–6)
ERYTHROCYTE [DISTWIDTH] IN BLOOD BY AUTOMATED COUNT: 18.4 % (ref 11.7–14.2)
GLOBULIN SER CALC-MCNC: 3.3 G/DL (ref 2.2–4)
GLUCOSE SERPL-MCNC: 126 MG/DL (ref 70–99)
HCT VFR BLD AUTO: 26.2 % (ref 37–53)
HGB BLD-MCNC: 9.1 G/DL (ref 13.5–17.5)
LYMPHOCYTES # BLD: 0.59 K/MM3 (ref 0.84–5.2)
LYMPHOCYTES NFR BLD: 7 % (ref 21–46)
MAGNESIUM SERPL-MCNC: 2.2 MG/DL (ref 1.6–2.4)
MCHC RBC AUTO-ENTMCNC: 34.7 G/DL (ref 31.5–36.5)
MCV RBC AUTO: 87 FL (ref 80–100)
MONOCYTES # BLD: 0.68 K/MM3 (ref 0.16–1.47)
MONOCYTES NFR BLD: 8 % (ref 4–13)
MYELOCYTES # BLD MANUAL: 0.25 K/MM3 (ref 0–0)
MYELOCYTES NFR BLD MANUAL: 3 % (ref 0–0)
NEUTS BAND NFR BLD MANUAL: 26 % (ref 0–8)
NEUTS SEG # BLD MANUAL: 6.97 K/MM3 (ref 1.96–9.15)
NEUTS SEG NFR BLD MANUAL: 56 % (ref 41–73)
NRBC # BLD AUTO: 0.16 K/MM3 (ref 0–0.02)
NRBC BLD AUTO-RTO: 1.9 /100 WBC (ref 0–0.2)
PH BLDV: 7.33 [PH] (ref 7.34–7.37)
PHOSPHATE SERPL-MCNC: 3.5 MG/DL (ref 2.5–4.9)
PLATELET # BLD AUTO: 100 K/MM3 (ref 150–400)
POTASSIUM SERPL-SCNC: 3.8 MMOL/L (ref 3.5–5.5)
PROT SERPL-MCNC: 5.1 G/DL (ref 6.4–8.2)
SODIUM SERPL-SCNC: 142 MMOL/L (ref 136–145)
TOTAL CELLS COUNTED BLD: 100
VANCOMYCIN SERPL-MCNC: 26.5 UG/ML

## 2022-12-16 NOTE — NUR
END OF SHIFT
OVERNIGHT EVENTS.WHEN ROUNDING AT 3 AM I NOTICED A LARGE AREA OF WHAT APPERED
TO BE TUBE FEEDS ALSO SUCTION TUBE FEEDS ORAL. UP UNTILL THAT POINT PT OXYGEN
NEED INCREASED AND THE PEEP HAD TO BE INCREASE TO MAINTAIN SATURATIONS. I
CALLED MD AND ORDERS MARY KAYR CHICHO FOR X-RAY THEN CT OF [facility] ABD PELVIS WAS
ORDERED. OTHER WALLS PT HAS BEEN PERLITA STABLE AND I BEEN ABLE TO TITRATE DOWN
HIS LEVO IT IS CURRENTLY AT 3. PT URIN U/O WAS ONLY 45 FOR 12HRS POSSIBLE PLAN
FOR HD THIS DAY. WILL CONTINUE TO MONITOR AND REPORT OFF TO ONCOMING RN

## 2022-12-16 NOTE — NUR
Spiritual Care Visit.
Pt. is intubated an non responsive. Pts. spouse, daughter, and friend are
present. Spouse verbalizes the plan to wake the Pt. up and do a bunch of
tests. Listen with empathy and a calming presence. Daughter displays evidence
of present grief. Prayed for Pt. and the family. Spouse verbalized gratitude
for the spiritual care visit.

## 2022-12-16 NOTE — NUR
[place] of Care:
 
Care assumed at 0700hr. Patient intubated and sedated with propofol gtt at
40mcg/kg/min. Tolerating vent without difficulty. No response to verbal
stimuli. Withdraws all extremities to painful stimuli. Pupils equal and
reactive. Questionable gag and cough reflexes. VSS, with levophed gtt at
3mcg/min. Mart cath patent and intact, draining minimal amount of clear
yellow urine. Central line to rt IJ patent and intact, infusing without
difficulty.
Multiple family members (wife, daughter, niece) at bedside at shift change.
Informed family of plan to obtain repeat head CT this morning, then stop
sedation to better assess neuro function. Daughter also requesting EEG scan
this morning. This RN and Dr. Bronson informed family that EEG results likely to
be non-definitive and head CT along with stopping sedation is best way to
assess neuro. Family remained insistent on EEG scan, therefor order for EEG
obtained from Dr. Mayo.
Patient transported to CT and back without difficulty, head CT results
unremarkable. EEG being obtained at this time. Propofol gtt decreased to 20mcg
at approx 1200, and stopped at approx 1300hr. No change in neuro thus far.
Plan to leave propofol gtt of as long as patient remains stable.
Dr. Bronson discussed need for dialysis this morning with family at bedside.
Family discussed plan await results of CT scan and sedation vacation before
moving forward with dialysis and dialysis catheter. Also discussed this plan
with Dr. Apple.

## 2022-12-16 NOTE — NUR
Shift Summary:
 
See assumption note r/t 1/2 of shift. Sedation off from approx 1130 to 1645hr.
No significant changes in neuro after stopping sedation. Patient became more
restless, turning head side-to-side, moving all extremities, but not following
any commands. Slightly opened eyes x2 but not tracking staff. Family requested
to re-start sedation,  and this nurse agreed. Multiple discussions with
family at bedside r/t plan of care and options. Dr. Apple also had long
discussion with family at bedside. Family decided to wait until tomorrow
morning (after stopping sedation), to make decision on inserting dialysis
catheter. Levophed gtt turned off while sedation stopped, but turned back on
at 2mcg approx 1800hr r/t systolic in the 70's, BP now stable. Propofol gtt
restarted at 20mcg and titrated up to 30mcg. All other VSS, will continue to
monitor until report to NOC shift RN.

## 2022-12-16 NOTE — NUR
ASSUMED CARE OF PT AT 1900
INTUBATED AND SEDATED.  NO VISITORS IN ROOM AT THIS TIME.
BP 87/76 HR 90'S
VENT AT 16/500/10/60%
ROSAS DRAINING TO GRAVITY.  RECTAL TUBE IN PLACE WITH NO APPARENT ISSUES.
TF @ 20MLS/HR
PROP @ 30 MCG/KG/MIN
LEVO @ 2 MCG/MIN
TKO AT 10 MLS/HR.
SEE FULL ASSESSMENT FOR FURTHER INFORMATION.

## 2022-12-17 LAB
ALBUMIN SERPL BCP-MCNC: 2 G/DL (ref 3.4–5)
ALBUMIN/GLOB SERPL: 0.6 {RATIO} (ref 0.8–1.8)
ALT SERPL W P-5'-P-CCNC: 57 U/L (ref 12–78)
ANION GAP SERPL CALCULATED.4IONS-SCNC: 18 MMOL/L (ref 6–16)
AST SERPL W P-5'-P-CCNC: 180 U/L (ref 12–37)
BASOPHILS # BLD: 0 K/MM3 (ref 0–0.23)
BASOPHILS NFR BLD: 0 % (ref 0–2)
BILIRUB SERPL-MCNC: 1.1 MG/DL (ref 0.1–1)
BUN SERPL-MCNC: 104 MG/DL (ref 8–24)
CALCIUM SERPL-MCNC: 6.6 MG/DL (ref 8.5–10.1)
CHLORIDE SERPL-SCNC: 100 MMOL/L (ref 98–108)
CO2 SERPL-SCNC: 21 MMOL/L (ref 21–32)
CREAT SERPL-MCNC: 4.46 MG/DL (ref 0.6–1.2)
DEPRECATED RDW RBC AUTO: 58.9 FL (ref 35.1–46.3)
EOSINOPHIL # BLD: 0.08 K/MM3 (ref 0–0.68)
EOSINOPHIL NFR BLD: 1 % (ref 0–6)
ERYTHROCYTE [DISTWIDTH] IN BLOOD BY AUTOMATED COUNT: 18.6 % (ref 11.7–14.2)
GLOBULIN SER CALC-MCNC: 3.4 G/DL (ref 2.2–4)
GLUCOSE SERPL-MCNC: 109 MG/DL (ref 70–99)
HCT VFR BLD AUTO: 23.8 % (ref 37–53)
HGB BLD-MCNC: 8.4 G/DL (ref 13.5–17.5)
LYMPHOCYTES # BLD: 0.93 K/MM3 (ref 0.84–5.2)
LYMPHOCYTES NFR BLD: 11 % (ref 21–46)
MAGNESIUM SERPL-MCNC: 2.5 MG/DL (ref 1.6–2.4)
MCHC RBC AUTO-ENTMCNC: 35.3 G/DL (ref 31.5–36.5)
MCV RBC AUTO: 87 FL (ref 80–100)
METAMYELOCYTES # BLD MANUAL: 0.16 K/MM3 (ref 0–0)
METAMYELOCYTES NFR BLD MANUAL: 2 % (ref 0–0)
MONOCYTES # BLD: 0.5 K/MM3 (ref 0.16–1.47)
MONOCYTES NFR BLD: 6 % (ref 4–13)
MYELOCYTES # BLD MANUAL: 0.16 K/MM3 (ref 0–0)
MYELOCYTES NFR BLD MANUAL: 2 % (ref 0–0)
NEUTS BAND NFR BLD MANUAL: 18 % (ref 0–8)
NEUTS SEG # BLD MANUAL: 6.59 K/MM3 (ref 1.96–9.15)
NEUTS SEG NFR BLD MANUAL: 60 % (ref 41–73)
NRBC # BLD AUTO: 0.16 K/MM3 (ref 0–0.02)
NRBC BLD AUTO-RTO: 1.9 /100 WBC (ref 0–0.2)
PHOSPHATE SERPL-MCNC: 3.9 MG/DL (ref 2.5–4.9)
PLATELET # BLD AUTO: 83 K/MM3 (ref 150–400)
POTASSIUM SERPL-SCNC: 4.4 MMOL/L (ref 3.5–5.5)
PROT SERPL-MCNC: 5.4 G/DL (ref 6.4–8.2)
SODIUM SERPL-SCNC: 139 MMOL/L (ref 136–145)
TOTAL CELLS COUNTED BLD: 100

## 2022-12-17 NOTE — NUR
UPDATE
DR GARCIA AND DR ORDAZ SPOKE WITH FAMILY. FAMILY VERBALIZES UNDERSTANDING OF PT
ILLNESS AND AFTER PRIVATE CONVERSATION HAS DECIDED TO TRANSITION TO COMFORT
CARE. PT REQUESTS PALLIATIVE AND  TO BE PRESENT DURING EXTUBATION.

## 2022-12-17 NOTE — NUR
EXTUBATION
FAMILY AT BEDSIDE FOR EXTUBATION. PT RECEIVING CONTINUOUS MORPHINE. PT
EXTUBATED BY JOSSIE YOST AT 1324.
TIME OF DEATH 1336.
[facility]  HOME CONTACTED PER FAMILY REQUEST. BELONGINGS TAKEN HOME
BY FAMILY EXCEPT BLANKET TO COVER PT.

## 2022-12-17 NOTE — NUR
END OF SHIFT SUMMARY
PT SEDATED AND INTUBATED.  NO PURPOSEFUL MOVEMENTS THROUGHOUT THE SHIFT.
BP HYPOTENSIVE SYST 80'S/50'S.  HR 90'S.
LEVO AT 3 MCG.
TKO @ 10 MLS/HR
PROP @ 30 MCG
ABX @ 12.5 MLS/HR.
LUNG SOUNDS COARSE WITH DIMINISHED BASES. SUCTION OF ORAL AREAS HAVE PRODUCED
SIGNIFICANT BLOOD AND BLOOD CLOTS.  OG TUBE REINSERTED THIS SHIFT D/T
PREVIOUS PLACEMENT OR MOVEMENT. INTERMITENT SUCTION PRODUCED 1300 MLS OF GREEN
STOMACHE CONTENT.
ROSAS DRAINING TO GRAVITY WITH NO URINE OUT THIS SHIFT. FAMILY IS TO BE MAKING
DECISION IF DIALYSIS CAN BE PART OF TREATMENT AT THIS TIME.
WILL CONTINUE TO MONITOR UNTIL SHIFT REPORT IS GIVEN TO DAYSHIFT RN.
RECTAL TUBE INTACT WITH SCANT DRAINAGE OF LIQUID STOOL.

## 2022-12-17 NOTE — NUR
Call back -
 
Met with pt's daughter initially. She reflects on her dad's life and the
current plan to extubate. She states her goal for Osmany - that he pass
comfortably and that God would be with him. Pt's spouse Deana enters.
 She verbalized remembrance of earlier interaction on Monday.
Supplication was supplied and family verbalized gratitude. Empathic
listening/Pastoral presence provided. Extubation planned within the hour.

## 2022-12-17 NOTE — NUR
ASSUMPTION OF CARE
PT REMAINS INTUBATED WITH VENT SETTINGS AC/VC 16/55/10/50%. HE IS RECEIVING
PROPOFOL 30MCG/KG/MIN AND LEVOPHED 3MCG/MIN. PT RESPONDS TO NOXIOUS STIMULI BY
MOVING HEAD AND SMALL MOVEMENT OF EXTREMITIES. TUBE FEEDING ON STANDBY. OGT
CONNECTED TO LOW INT SUCTION WITH DARK GREEN/BLACK DRAINAGE. ABDOMEN
DISTENDED AND TENDER, BOWEL TONES HYPOACTIVE. RECTAL TUBE PATENT WITH MINIMAL
LIQUID STOOL IN TUBING. PT OLIGURIC WITH SMALL AMOUNT OF URINE IN TUBING.
FAMILY AT BEDSIDE AND REQUEST FULL DAY OF SEDATION VACATION. PROPOFOL PLACED
ON STANDBY AT 0705. SEE SHIFT ASSESSMENT.

## 2022-12-17 NOTE — NUR
Met with family per their request.  They have made the decision to transition
to comfort care.  Deana, pt's wife, states that they have made the decision
for comfort care however, she reports that she is angry about hospital
policies that prevented her from staying with Topher over night.  She is
working through her feelings with support from her family.  Explained comfort
care and how staff will be present to support Topher and his family.  Provided
a list of mortuaries per their request.  They would like a special blanket
that they brought from home to cover Topher once he passes and to cover him as
he leaves the facility with mortuary staff.  Offered emotional support.
 contacted by nursing superviser to be present for transitioning to
comfort care this afternoon.  PC to remain available.